# Patient Record
Sex: MALE | Race: WHITE | NOT HISPANIC OR LATINO | URBAN - METROPOLITAN AREA
[De-identification: names, ages, dates, MRNs, and addresses within clinical notes are randomized per-mention and may not be internally consistent; named-entity substitution may affect disease eponyms.]

---

## 2023-12-19 ENCOUNTER — EMERGENCY (EMERGENCY)
Facility: HOSPITAL | Age: 43
LOS: 1 days | Discharge: HOME | End: 2023-12-19
Attending: EMERGENCY MEDICINE
Payer: MEDICAID

## 2023-12-19 VITALS
TEMPERATURE: 98 F | OXYGEN SATURATION: 99 % | RESPIRATION RATE: 19 BRPM | WEIGHT: 195.11 LBS | HEART RATE: 59 BPM | SYSTOLIC BLOOD PRESSURE: 133 MMHG | HEIGHT: 68 IN | DIASTOLIC BLOOD PRESSURE: 85 MMHG

## 2023-12-19 PROCEDURE — 99283 EMERGENCY DEPT VISIT LOW MDM: CPT

## 2023-12-19 NOTE — ED ADULT TRIAGE NOTE - LOCATION:
OUTPATIENT PSYCHIATRIC PROGRESS NOTE    VISIT INFORMATION   This visit was performed via: Video Visit  This visit was performed via telemedicine using two-way, real-time interactive telecommunications between the patient and the PMHNP/PMHCNS. The interactive telecommunication technology included audio and video.   Consent: The patient/guardian was offered telemedicine/telehealth as an option for care delivery and consented to this option.    Reason for Visit: Routine follow up, medication management and acute illness  Reason for Use of Telehealth: Minimize risk of potential exposure to viral respiratory illness during Covid 19 pandemic and need for immediate assessment  Provider Location: Provider from [facility] [address], [address], [place] [address]. Provider located in off-site office in [place], [place].   Patient Location: Home   Visit Attendance: Patient, mother    CHIEF COMPLAINT   Follow up visit    SUBJECTIVE  Patient and mother were seen together.  Patient reports that things have been going well.  Pt is done with school.  He has a couple weeks until summer school next week.  Patient says that the end of the school year went \"great\".  Pt is excited that the student that was bothering him is going to be moving and will not be there anymore.  This made him much more excited and happy to go to school.  Mother states that since our last visit things have been about the same.  Sometimes he gets a reminder at school that he needs to use kind words.  He has not had any physical issues at school and is getting work done.  He is not having issues with eloping or leaving the house.  The issues that are coming up in school are very minor overall.  Mother states that patient will still have times where he focuses or fixates on something he doesn't like and it upsets him.  This is happening more than it was before.  Mother gives the example of him not liking a villain in a movie and  fixating on that.  This seems to be happening on at least a daily basis now.  Mother states that when he is busy or occupied with something it is not as much of a problem.  Mother states that appetite is about the same - they are doing their best to limit his appetite.  Mother reports that patient is 183 lbs today.  This is around the same as March for patient.  Mother is hopeful that in the summer he will be more active.      Appetite: Fair - higher than previously  Sleep: Sleeping well    New symptoms/signs: None    Medication(s) side effects: Hyperhidrosis, weight gain    Past Medical/Family Hx: No changes  Social Hx: No changes    ROS:   Neuro: denies HA, parasthesias, dizziness  GI:  denies n/v/d/c, abdominal pain  All other systems reviewed and are negative    OBJECTIVE:     Outpatient Current Medications as of as of 6/6/2023       Disp Refills Start End    dexmethylphenidate (FOCALIN XR) 20 MG 24 hr capsule 11 capsule 0 5/26/2023     Sig - Route: Take 1 capsule by mouth every morning. - Oral    Class: Eprescribe    Earliest Fill Date: 5/26/2023    DULoxetine (Cymbalta) 60 MG capsule 30 capsule 2 3/1/2023     Sig - Route: Take 1 capsule by mouth daily. - Oral    Class: Eprescribe    ARIPiprazole (ABILIFY) 5 MG tablet 30 tablet 2 3/1/2023     Sig - Route: Take 1 tablet by mouth at bedtime. - Oral    Class: Eprescribe    dexmethylphenidate (FOCALIN XR) 20 MG 24 hr capsule 30 capsule 0 3/27/2023 4/26/2023    Sig - Route: Take 1 capsule by mouth every morning. Do not start before March 27, 2023. - Oral    Class: Eprescribe    Earliest Fill Date: 3/27/2023    dexmethylphenidate (FOCALIN XR) 20 MG 24 hr capsule 30 capsule 0 2/27/2023 3/29/2023    Sig - Route: Take 1 capsule by mouth every morning. - Oral    Class: Eprescribe    Earliest Fill Date: 2/27/2023    pimecrolimus (ELIDEL) 1 % cream 30 g 0 12/5/2022     Sig - Route: Apply topically 2 times daily. - Topical    Class: Eprescribe    Renewals     Renewal  requests to authorizing provider (Faith Moody MD) <b>prohibited</b>          benzoyl peroxide 10 % lotion 30 mL 0 12/5/2022     Sig - Route: Apply topically 2 times daily. - Topical    Class: Eprescribe    Renewals     Renewal requests to authorizing provider (Faith Moody MD) <b>prohibited</b>          gabapentin (NEURONTIN) 300 MG capsule 10 capsule 0 8/29/2022     Sig - Route: Take 1 capsule by mouth daily as needed (Agitation). - Oral    Class: Eprescribe    melatonin 3 MG        Sig - Route: Take 4.5 mg by mouth nightly. - Oral    Class: Historical Med    Pediatric Multiple Vit-C-FA (FLINTSTONES/MY FIRST) with C & FA Chew Tab        Class: Historical Med    Route: Oral           ALLERGIES:   Allergen Reactions   • Penicillins HIVES   • Amoxicillin-Pot Clavulanate HIVES   • Cefdinir RASH        There were no vitals filed for this visit.      MENTAL STATUS EXAM:   Appearance: Appears stated age, dressed in casual clothes, appropriate grooming/hygeine   Behavior: Calm, cooperative  Muscle tone/strength: Unable to assess  Gait: Unable to assess  Psychomotor: No PMR/PMA  Speech/Language: Short answers  Mood: \"Good\"  Affect: Restricted  Thought Process/Associations: Rigid  Thought Content: [place]  Perceptions: no evidence of response to internal stimuli.  Orientation: A&Ox3  Fund of knowledge: Unable to assess  Memory (immediate /recent / remote): able to narrate past events related to his/her history  Attention: Fair   Insight: limited  Judgment: Fair    ASSESSMENT:  Pt is a 18 year old M with a hx of ADHD and ASD as well as unspecified anxiety that presents for a follow up visit.  Mother reports that things have been going fairly well overall.  Patient's fixation on certain topics has been a bit more intense recently.  He sometimes ruminates on the topic but then starts to get him more agitated and upset.  This is something that he was doing before in the past prior to his transition over  Left arm; to Abilify and Cymbalta.  Mother is noticing that it is becoming more frequent.  We discussed trying to increase his Abilify slightly given he is on a pretty low dose to see if this helps.  Additionally, we will plan to get some lab work done to see how his cholesterol panel has changed.     Psych Diagnosis  ASD  ADHD, combined type  ISSAC     PLAN  Medication  - Continue Focalin XR 20 mg daily.  Discussed the risks, benefits, and side effects with patient and guardian including the risk for serious CV events, changes in BP and HR, and long term growth suppression.  Guardian verbalized understanding and consented to treatment.  - Increase Abilify to 7.5 mg at bedtime.  Discussed the risks, benefits, and side effects with patient and guardian including the risk of NMS, TD, and hyperglycemia.  Guardian verbalized understanding and consented to treatment.  - Continue Cymbalta 60 mg daily.  Discussed the risks, benefits, and side effects with patient and guardian including the risk for serotonin syndrome and the black box warning for increased suicidal thoughts in individuals under the age of 24.  Guardian verbalized understanding and consented to treatment.  - Continue gabapentin 300 mg daily PRN for agitation.  Discussed the risks, benefits, and side effects with patient and guardian including the risk for sedation, ataxia, and development of SI.  Guardian verbalized understanding and consented to treatment.    Therapy/Other Providers  - Continue regular therapy with Betsy Yin through [facility] ([phone])  - Continue follow up with Dr. Navarro    Miscellaneous  - Labs done in January 2023 - triglycerides slightly elevated; CMP, Lipids, and A1c ordered  - Pt was seen in person by author on 9/22/22     Instructed pt to follow up in 1 month or call sooner with any questions or concerns    Ryan Berman MD

## 2023-12-19 NOTE — ED PROVIDER NOTE - NSFOLLOWUPCLINICSTOKEN_GEN_ALL_ED_FT
[id number]: || ||00\01||False;[id number]: || ||00\01||False;[id number]: || ||00\01||False; [id number]: || ||00\01||False;[id number]: || ||00\01||False;[id number]: || ||00\01||False;

## 2023-12-19 NOTE — ED PROVIDER NOTE - NSFOLLOWUPCLINICS_GEN_ALL_ED_FT
[facility] - Ophthalmology Clinic  Ophthalmology  [address], [address] [place] [address]  Phone: [phone]  Fax:     Health system - Ophthalmology  Ophthalmology  [address], [address]  [place] [address]  Phone: [phone]  Fax:     [facility] Ophthalmology  Ophthalmology  [address], [address]  [place] [address]  Phone: [phone]  Fax:      [facility] - Ophthalmology Clinic  Ophthalmology  [address] [place] [address]  Phone: [phone]  Fax:     [facility] - Ophthalmology  Ophthalmology  [address], [address]  [place] [address]  Phone: [phone]  Fax:     [facility] Ophthalmology  Ophthalmology  [address] [facility] [facility], [address]  [place] [address]  Phone: [phone]  Fax:

## 2023-12-19 NOTE — ED PROVIDER NOTE - OBJECTIVE STATEMENT
43-year-old male no pertinent past medical history presenting to emergency department for left eyelid swelling since this morning.  Patient states he woke up and noticed swelling. Endorsing itchy tearing to left eye. Took benadryl at 8am which did not improve symptoms.  Denies trauma, prior episodes.  No visual change, pain with extraocular motor movement.  Denies fever, rash, other complaint.

## 2023-12-19 NOTE — ED ADULT NURSE NOTE - NSFALLUNIVINTERV_ED_ALL_ED
Bed/Stretcher in lowest position, wheels locked, appropriate side rails in place/Call bell, personal items and telephone in reach/Instruct patient to call for assistance before getting out of bed/chair/stretcher/Non-slip footwear applied when patient is off stretcher/[place] to call system/Physically safe environment - no spills, clutter or unnecessary equipment/Purposeful proactive rounding/Room/bathroom lighting operational, light cord in reach Bed/Stretcher in lowest position, wheels locked, appropriate side rails in place/Call bell, personal items and telephone in reach/Instruct patient to call for assistance before getting out of bed/chair/stretcher/Non-slip footwear applied when patient is off stretcher/[place] to call system/Physically safe environment - no spills, clutter or unnecessary equipment/Purposeful proactive rounding/Room/bathroom lighting operational, light cord in reach

## 2023-12-19 NOTE — ED ADULT NURSE NOTE - CAS EDP DISCH TYPE
Attempted to call patient to discuss results. He was positive for C. Diff as well in August 2018. Pt will likely need repeat treatment for C. Diff. Left  requesting return call. Home

## 2023-12-19 NOTE — ED PROVIDER NOTE - CLINICAL SUMMARY MEDICAL DECISION MAKING FREE TEXT BOX
43-year-old male no pertinent past medical history presenting to emergency department for left eyelid swelling since this morning.  Patient states he woke up and noticed swelling. Endorsing itchy tearing to left eye. Took benadryl at 8am which did not improve symptoms.  Denies trauma, prior episodes.  No visual change, pain with extraocular motor movement.  Denies fever, rash, other complaint. PE notable for left periorbital swelling, no FB, no corneal abrasion on exam,  IOP 23 b/l, Negative Adela's sign, PERRL bilaterally EOMI, concerning for allergic rxn, blepharitis/ periorbital cellulitis. No e/o orbital cellulitis. Plan for antihistamines, warm compress, abx, ophtho follow up with strict return precaution.

## 2023-12-19 NOTE — ED PROVIDER NOTE - PATIENT PORTAL LINK FT
You can access the FollowMyHealth Patient Portal offered by [facility] by registering at the following website: [email or web address]. By joining [employer]’s FollowMyHealth portal, you will also be able to view your health information using other applications (apps) compatible with our system. You can access the FollowMyHealth Patient Portal offered by [facility] by registering at the following website: [email or web address]. By joining [employer]’s FollowMyHealth portal, you will also be able to view your health information using other applications (apps) compatible with our system.

## 2023-12-19 NOTE — ED PROVIDER NOTE - NSFOLLOWUPINSTRUCTIONS_ED_ALL_ED_FT
1) Follow up with your doctor as discussed.   2) Return to the ED immediately for new or worsening symptoms like visual changes, pain with eye movement, fever  3) Please continue to take any home medications as prescribed  4) Your test results from your ED visit were discussed with you prior to discharge  5) You were provided with a copy of your test results  6) We have sent medication to your pharmacy. You may also use warm compresses and continue taking Benadryl (as directed on the box) to help with symptoms.

## 2023-12-19 NOTE — ED ADULT NURSE NOTE - OBJECTIVE STATEMENT
Patient reported he woke up this morning with left eye swollen. No stings per patient. Left eye- watery, tender and mild pain. No drainage. Patient took benadryl this am.

## 2023-12-19 NOTE — ED PROVIDER NOTE - ATTENDING APP SHARED VISIT CONTRIBUTION OF CARE
44-year-old male with a past medical history, cat allergies but no other specific allergies, does not own a cat, presents with left periorbital eye swelling, noted this morning on awakening. Denies vision changes, significant pain, fever, chills, and all other symptoms. On exam, afebrile, hemodynamically stable, saturating well on room air, NAD, well appearing, sitting comfortably in bed, no WOB, speaking full sentences, head NCAT, PERRL, EOMI, anicteric, no conjunctival injection, no abrasion on fluorescein staining, noted left periorbital soft skin edema, mild pinkish tinge, no induration/fluctuance, MMM, RRR, breathing comfortably on room air, AAO, CN's 3-12 grossly intact, PATEL spontaneously, no leg cyanosis or edema, skin warm, well perfused. Symptoms could be consistent with blepharitis and will prescribe cephalexin. Low suspicion for orbital cellulitis. Could be allergic as well, though patient with no specific allergic contacts, will attempt further Benadryl. Noted mildly elevated bilateral eye pressures, low suspicion for acute glaucoma, this was discussed with patient as well as need for ophthalmology follow-up. Patient is well appearing, NAD, afebrile, hemodynamically stable. Discharged with instructions in further symptomatic care, return precautions.

## 2024-02-12 PROBLEM — Z00.00 ENCOUNTER FOR PREVENTIVE HEALTH EXAMINATION: Status: ACTIVE | Noted: 2024-02-12

## 2024-02-21 ENCOUNTER — APPOINTMENT (OUTPATIENT)
Dept: ORTHOPEDIC SURGERY | Facility: CLINIC | Age: 44
End: 2024-02-21

## 2024-05-07 NOTE — ED ADULT NURSE NOTE - NSFALLRISKFACTORS_ED_ALL_ED
What Is The Reason For Today's Visit?: History of Non-Melanoma Skin Cancer How Many Skin Cancers Have You Had?: more than one When Was Your Last Cancer Diagnosed?: 10/05/2023 No indicators present